# Patient Record
Sex: FEMALE | Race: WHITE | Employment: FULL TIME
[De-identification: names, ages, dates, MRNs, and addresses within clinical notes are randomized per-mention and may not be internally consistent; named-entity substitution may affect disease eponyms.]

---

## 2024-08-07 ENCOUNTER — HOSPITAL ENCOUNTER (OUTPATIENT)
Facility: HOSPITAL | Age: 39
Discharge: HOME | End: 2024-08-10
Payer: COMMERCIAL

## 2024-08-07 VITALS — WEIGHT: 266 LBS | BODY MASS INDEX: 40.32 KG/M2 | HEIGHT: 68 IN

## 2024-08-07 DIAGNOSIS — Z12.31 VISIT FOR SCREENING MAMMOGRAM: ICD-10-CM

## 2024-08-07 PROCEDURE — 77067 SCR MAMMO BI INCL CAD: CPT

## 2024-10-02 ENCOUNTER — HOSPITAL ENCOUNTER (OUTPATIENT)
Facility: HOSPITAL | Age: 39
Discharge: HOME | End: 2024-10-05
Payer: COMMERCIAL

## 2024-10-02 DIAGNOSIS — R92.8 ABNORMAL MAMMOGRAM: ICD-10-CM

## 2024-10-02 PROCEDURE — 76642 ULTRASOUND BREAST LIMITED: CPT

## 2024-10-02 PROCEDURE — G0279 TOMOSYNTHESIS, MAMMO: HCPCS

## 2024-11-15 ENCOUNTER — OFFICE VISIT (OUTPATIENT)
Age: 39
End: 2024-11-15

## 2024-11-15 VITALS
RESPIRATION RATE: 18 BRPM | TEMPERATURE: 97.8 F | OXYGEN SATURATION: 94 % | BODY MASS INDEX: 41.55 KG/M2 | SYSTOLIC BLOOD PRESSURE: 124 MMHG | DIASTOLIC BLOOD PRESSURE: 84 MMHG | HEART RATE: 86 BPM | WEIGHT: 271 LBS

## 2024-11-15 DIAGNOSIS — H00.016 HORDEOLUM EXTERNUM OF LEFT EYE, UNSPECIFIED EYELID: Primary | ICD-10-CM

## 2024-11-15 RX ORDER — LOSARTAN POTASSIUM 25 MG/1
25 TABLET ORAL DAILY
COMMUNITY

## 2024-11-15 RX ORDER — POLYMYXIN B SULFATE 500000 [IU]/1
10000 INJECTION, POWDER, LYOPHILIZED, FOR SOLUTION INTRAMUSCULAR; INTRATHECAL; INTRAVENOUS; OPHTHALMIC EVERY 24 HOURS
COMMUNITY

## 2024-11-15 RX ORDER — ATORVASTATIN CALCIUM 40 MG/1
40 TABLET, FILM COATED ORAL DAILY
COMMUNITY

## 2024-11-15 NOTE — PROGRESS NOTES
spreading. Left eye./)      No results found for any visits on 11/15/24.    No results found for this visit on 11/15/24.    Review of Systems    ROS reviewed and negative except as stated in the HPI   Physical Exam  Vitals reviewed.   Constitutional:       General: She is not in acute distress.     Appearance: Normal appearance. She is not ill-appearing or toxic-appearing.   HENT:      Head: Normocephalic and atraumatic.   Eyes:      Extraocular Movements: Extraocular movements intact.      Conjunctiva/sclera: Conjunctivae normal.      Pupils: Pupils are equal, round, and reactive to light.   Cardiovascular:      Rate and Rhythm: Normal rate and regular rhythm.   Pulmonary:      Effort: Pulmonary effort is normal. No respiratory distress.   Abdominal:      General: There is no distension.      Palpations: There is no mass.      Tenderness: There is no abdominal tenderness.   Skin:     General: Skin is warm.      Comments: There is swelling and erythema in both the upper and lower eyelids of the left eye   Neurological:      General: No focal deficit present.      Mental Status: She is alert and oriented to person, place, and time.        Vitals:    11/15/24 1157   BP: 124/84   Site: Left Upper Arm   Position: Sitting   Cuff Size: Large Adult   Pulse: 86   Resp: 18   Temp: 97.8 °F (36.6 °C)   TempSrc: Temporal   SpO2: 94%   Weight: 122.9 kg (271 lb)        No Known Allergies    Prior to Admission medications    Medication Sig Start Date End Date Taking? Authorizing Provider   polymyxin B 309480 units injection 10,000 Units/kg every 24 hours   Yes Benjie Frias MD   adalimumab (HUMIRA) 40 MG/0.8ML injection Inject 0.8 mLs into the skin once   Yes Benjie Frias MD   atorvastatin (LIPITOR) 40 MG tablet Take 1 tablet by mouth daily   Yes Benjie Frias MD   losartan (COZAAR) 25 MG tablet Take 1 tablet by mouth daily   Yes Benjie Frias MD        Past Medical History:   Diagnosis Date

## 2024-11-15 NOTE — PATIENT INSTRUCTIONS
Exam and history consistent with hordeolum externum (stye) in the upper and lower eyelids of the left eye.  -Finish the entirety of the previously prescribed antibiotic course regardless of symptoms to avoid recurrence and antibiotic resistance  -Apply warm compresses to affected eyelids for at least 15 minutes 3 times a day  -Please note that a stye normally lasts from 1 to 2 weeks  -Ibuprofen 600 mg every 6 hours and Tylenol 500 mg every 6 hours for pain control  -Drink plenty of fluids to maintain hydration    If symptoms persist or worsen, please contact PCP and/or return to Urgent Care.

## 2024-11-19 ENCOUNTER — HOSPITAL ENCOUNTER (EMERGENCY)
Facility: HOSPITAL | Age: 39
Discharge: HOME | End: 2024-11-19
Attending: EMERGENCY MEDICINE
Payer: COMMERCIAL

## 2024-11-19 VITALS
HEIGHT: 67 IN | RESPIRATION RATE: 22 BRPM | DIASTOLIC BLOOD PRESSURE: 68 MMHG | HEART RATE: 88 BPM | TEMPERATURE: 98.5 F | BODY MASS INDEX: 42.38 KG/M2 | WEIGHT: 270 LBS | SYSTOLIC BLOOD PRESSURE: 134 MMHG | OXYGEN SATURATION: 96 %

## 2024-11-19 DIAGNOSIS — H00.15 CHALAZION LEFT LOWER EYELID: ICD-10-CM

## 2024-11-19 DIAGNOSIS — L03.213 PRESEPTAL CELLULITIS OF LEFT UPPER EYELID: Primary | ICD-10-CM

## 2024-11-19 PROCEDURE — 99283 EMERGENCY DEPT VISIT LOW MDM: CPT

## 2024-11-19 RX ORDER — TOBRAMYCIN 3 MG/ML
1 SOLUTION/ DROPS OPHTHALMIC EVERY 4 HOURS
Qty: 5 ML | Refills: 0 | Status: SHIPPED | OUTPATIENT
Start: 2024-11-19 | End: 2024-11-29

## 2024-11-19 RX ORDER — CEPHALEXIN 500 MG/1
500 CAPSULE ORAL 4 TIMES DAILY
Qty: 28 CAPSULE | Refills: 0 | Status: SHIPPED | OUTPATIENT
Start: 2024-11-19 | End: 2024-11-26

## 2024-11-19 ASSESSMENT — PAIN - FUNCTIONAL ASSESSMENT: PAIN_FUNCTIONAL_ASSESSMENT: NONE - DENIES PAIN

## 2024-11-19 NOTE — DISCHARGE INSTRUCTIONS
Apply Tobramycin eye ointment and take Keflex antibiotic orally every 6 hours, and use warm compresses as well.  You can clean the eyelashes and eyelids with baby shampoo and warm washcloths.    It was a pleasure taking care of you at [facility] Emergency Department today.  We know that when you come to [facility], you are entrusting us with your health, comfort, and safety.  Our physicians and nurses honor that trust, and we appreciate the opportunity to care for you and your loved ones.  We also value your feedback, and we would like to hear from you.    If you receive a  >>> survey <<< about your Emergency Department experience today, please fill it out. We review every single response from our patients. Thank you!

## 2024-11-19 NOTE — ED NOTES
Discharge instructions were given to the patient by Suraj George RN  .  The patient left the Emergency Department alert and oriented and in no acute distress with 2 prescription(s). The patient was encouraged to call or return to the ED for worsening issues or problems and was encouraged to schedule a follow up appointment for continuing care.  The patient verbalized understanding of discharge instructions and prescriptions; all questions were answered. The patient has no further concerns at this time.

## 2024-11-19 NOTE — ED NOTES
Pt presents to ED complaining of left eye swelling and irritation that began this morning when she woke up. Pt endorses itching and drainage with vision partially obscured by upper eyelid swelling. Pt is alert and oriented x 4, RR even and unlabored, skin is warm and dry. Pt appears in NAD at this time. Assessment completed and pt updated on plan of care.  Call bell in reach.   Emergency Department Nursing Plan of Care  The Nursing Plan of Care is developed from the Nursing assessment and Emergency Department Attending provider initial evaluation.  The plan of care may be reviewed in the “ED Provider note”.  The Plan of Care was developed with the following considerations:  Patient / Family readiness to learn indicated by:Refer to Medical chart in [facility]  Persons(s) to be included in education: Refer to Medical chart in [facility]  Barriers to Learning/Limitations:Normal

## 2024-11-19 NOTE — ED TRIAGE NOTES
Pt presents to ED c/o L eye swelling, redness, itching, and occ drainage. Pt reports going to urgent care and being prescribed antibiotics eye gtts with no relief. Pt reports swelling became worse this AM and needing to take ibuprofen pt does report relief at this time.

## 2024-11-20 ASSESSMENT — ENCOUNTER SYMPTOMS
EYE PAIN: 0
EYE ITCHING: 0
EYE REDNESS: 0

## 2024-11-20 NOTE — ED PROVIDER NOTES
EMERGENCY DEPARTMENT HISTORY AND PHYSICAL EXAM    Date: 11/19/2024  Patient Name: Brooke Childs  Patient Age and Sex: 39 y.o. female  MRN:  [id number]  CSN:  [id number]    History of Present Illness     Chief Complaint   Patient presents with    Eye Problem       History Provided By: Patient    Ability to gather history was limited by:     HPI: Brooke Childs, 39 y.o. female   Complains of swelling and redness of her left upper and lower eyelids worsening over the last few days.  Started as a small external bump, minimally tender, on the lower eyelid, now with worsening redness and swelling of the upper eyelid.  No significant watering or crusting.  No vision changes.  Pain is mild.  She was prescribed topical antibiotic ophthalmic drops a few days ago which she has been using without improvement.      Tobacco Use      Smoking status: Unknown      Smokeless tobacco: Not on file     Past History   The patient's medical, surgical, and social history were reviewed by me today.    Current Medications:  No current facility-administered medications on file prior to encounter.     Current Outpatient Medications on File Prior to Encounter   Medication Sig Dispense Refill    polymyxin B [id number] units injection 10,000 Units/kg every 24 hours      adalimumab (HUMIRA) 40 MG/0.8ML injection Inject 0.8 mLs into the skin once      atorvastatin (LIPITOR) 40 MG tablet Take 1 tablet by mouth daily      losartan (COZAAR) 25 MG tablet Take 1 tablet by mouth daily         Past Medical History:   Diagnosis Date    Hyperlipidemia     Hypertension        Past Surgical History:   Procedure Laterality Date    BREAST BIOPSY Left     negative, 2021       Social History     Tobacco Use    Smoking status: Unknown       Allergies:  No Known Allergies  Review of Systems   A Review of Systems was reviewed by me today during this encounter.  Pertinent positive and negative elements are noted in the HPI and MDM sections.    Review of Systems

## 2025-01-27 ENCOUNTER — OFFICE VISIT (OUTPATIENT)
Age: 40
End: 2025-01-27

## 2025-01-27 VITALS
DIASTOLIC BLOOD PRESSURE: 84 MMHG | TEMPERATURE: 98 F | BODY MASS INDEX: 42.6 KG/M2 | WEIGHT: 272 LBS | OXYGEN SATURATION: 98 % | HEART RATE: 74 BPM | SYSTOLIC BLOOD PRESSURE: 134 MMHG

## 2025-01-27 DIAGNOSIS — M54.50 ACUTE BILATERAL LOW BACK PAIN WITHOUT SCIATICA: Primary | ICD-10-CM

## 2025-01-27 RX ORDER — METHOCARBAMOL 750 MG/1
750 TABLET, FILM COATED ORAL 4 TIMES DAILY
Qty: 40 TABLET | Refills: 0 | Status: SHIPPED | OUTPATIENT
Start: 2025-01-27 | End: 2025-02-06

## 2025-01-27 NOTE — PATIENT INSTRUCTIONS
Thank you for visiting [facility] today.    If you develop a fever, gross inability to walk/weakness, severe numbness in bilateral lower extremities, saddle paresthesias, and/or loss of bladder/bowel control, please go to the nearest emergency department for concern of cauda equina.    -Muscle Relaxer (Flexeril, Robaxin, Tizanidine) should only be taken if needed, as it may cause drowsiness or dizziness.  Avoid driving.  Avoid alcohol/drugs that can also make you sleepy.  -Alternate between ice and heat.  Heat may be applied for 30 minutes at a time every 4-5 hours.  Take warm epsom salt baths and gentle stretches.  -Tylenol 1000 mg every 8 hours as needed for pain control  -Increase water hydration.  -Avoid lifting heavy objects.  -Back exercises once pain has been controlled  -Physical therapy referral sent    Please follow up with your primary care provider within 2-3 days if  your signs and symptoms have not resolved or worsened.    Please go immediately to the Emergency Department if you develop loss of bowel or bladder function, peripheral numbness/weakness/tingling, shortness of breath, chest pain, and significant fevers above 100.4F.

## 2025-01-27 NOTE — PROGRESS NOTES
2025   Brooke Childs (: 1985) is a 39 y.o. female, Established patient, here for evaluation of the following chief complaint(s):  Back Pain (Low back pain x 2 days)     ASSESSMENT/PLAN:  Below is the assessment and plan developed based on review of pertinent history, physical exam, labs, studies, and medications.  Assessment & Plan  Acute bilateral low back pain without sciatica       Orders:    [facility] - Physical Therapy at [facility]    methocarbamol (ROBAXIN-750) 750 MG tablet; Take 1 tablet by mouth 4 times daily for 10 days  If you develop a fever, gross inability to walk/weakness, severe numbness in bilateral lower extremities, saddle paresthesias, and/or loss of bladder/bowel control, please go to the nearest emergency department for concern of cauda equina.    -Muscle Relaxer (Flexeril, Robaxin, Tizanidine) should only be taken if needed, as it may cause drowsiness or dizziness.  Avoid driving.  Avoid alcohol/drugs that can also make you sleepy.  -Alternate between ice and heat.  Heat may be applied for 30 minutes at a time every 4-5 hours.  Take warm epsom salt baths and gentle stretches.  -Tylenol 1000 mg every 8 hours as needed for pain control  -Increase water hydration.  -Avoid lifting heavy objects.  -Back exercises once pain has been controlled  -Physical therapy referral sent    Please follow up with your primary care provider within 2-3 days if  your signs and symptoms have not resolved or worsened.    Please go immediately to the Emergency Department if you develop loss of bowel or bladder function, peripheral numbness/weakness/tingling, shortness of breath, chest pain, and significant fevers above 100.4F        Handout given with care instructions  2. OTC for symptom management. Increase fluid intake, ensure adequate nutritional intake.  3. Follow up with PCP as needed.  4. Go to ED with development of any acute symptoms.     Follow up:  No follow-ups on file.  Follow up

## 2025-02-18 ENCOUNTER — HOSPITAL ENCOUNTER (OUTPATIENT)
Facility: HOSPITAL | Age: 40
Setting detail: RECURRING SERIES
Discharge: HOME | End: 2025-02-21
Payer: COMMERCIAL

## 2025-02-18 PROCEDURE — 97162 PT EVAL MOD COMPLEX 30 MIN: CPT

## 2025-02-18 PROCEDURE — 97110 THERAPEUTIC EXERCISES: CPT

## 2025-02-18 NOTE — THERAPY EVALUATION
seen *** times per week for *** treatments.    Patient/ Caregiver education and instruction: Diagnosis, prognosis, {Outpt PT patient caregiver ed.:12372}   [x]  Plan of care has been reviewed with LULU Yusuf, PT       2/18/2025       12:10 PM        ===================================================================  I certify that the above Therapy Services are being furnished while the patient is under my care. I agree with the treatment plan and certify that this therapy is necessary.    Physician's Signature:_________________________   DATE:_________   TIME:________                           Prasanth Peter MD    ** Signature, Date and Time must be completed for valid certification **  Please sign and fax to [phone].  Thank you

## 2025-02-26 ENCOUNTER — HOSPITAL ENCOUNTER (OUTPATIENT)
Facility: HOSPITAL | Age: 40
Setting detail: RECURRING SERIES
Discharge: HOME | End: 2025-03-01
Payer: COMMERCIAL

## 2025-02-26 PROCEDURE — 97110 THERAPEUTIC EXERCISES: CPT

## 2025-02-26 NOTE — PROGRESS NOTES
PHYSICAL THERAPY - DAILY TREATMENT NOTE (updated 3/23)      Date: 2025          Patient Name:  Brooke Childs :  1985   Medical   Diagnosis:  Acute bilateral low back pain without sciatica [M54.50] Treatment Diagnosis:  M54.59  OTHER LOWER BACK PAIN    Referral Source:  Prasanth Peter MD Insurance:   Payor: AETNA / Plan: AETNA / Product Type: *No Product type* /                     Patient  verified yes     Visit #   Current  / Total 2 24   Time   In / Out [phone]   Total Treatment Time 44   Total Timed Codes 44         SUBJECTIVE    Pain Level (0-10 scale): 0/10    Any medication changes, allergies to medications, adverse drug reactions, diagnosis change, or new procedure performed?: [x] No    [] Yes (see summary sheet for update)  Medications: Verified on Patient Summary List    Subjective functional status/changes:     Patient noted they have been doing well, noting they have not had any pain in their back in the last week or so.     OBJECTIVE      Therapeutic Procedures:  Tx Min Billable or 1:1 Min (if diff from Tx Min) Procedure, Rationale, Specifics   44 [phone] [id number] Therapeutic Exercise (timed):  increase ROM, strength, coordination, balance, and proprioception to improve patient's ability to progress to PLOF and address remaining functional goals. (see flow sheet as applicable)     Details if applicable:                         44 44    Total Total           [x]  Patient Education billed concurrently with other procedures   [x] Review HEP    [] Progressed/Changed HEP, detail:    [] Other detail:         Other Objective/Functional Measures  NA    Pain Level at end of session (0-10 scale): 0/10      Assessment   Patient tolerated treatment session well today, able to perform new exercises and progressions without increasing pain symptoms post treatment session. Patient was able to progress with strengthening and with core stability during therex today. Continue to progress as tolerated.  Patient

## 2025-03-03 ENCOUNTER — HOSPITAL ENCOUNTER (OUTPATIENT)
Facility: HOSPITAL | Age: 40
Setting detail: RECURRING SERIES
Discharge: HOME | End: 2025-03-06
Payer: COMMERCIAL

## 2025-03-03 PROCEDURE — 97110 THERAPEUTIC EXERCISES: CPT

## 2025-03-03 NOTE — PROGRESS NOTES
PHYSICAL THERAPY - DAILY TREATMENT NOTE (updated 3/23)      Date: 3/3/2025          Patient Name:  Brooke Childs :  1985   Medical   Diagnosis:  Acute bilateral low back pain without sciatica [M54.50] Treatment Diagnosis:  M54.59  OTHER LOWER BACK PAIN    Referral Source:  Prasanth Peter MD Insurance:   Payor: AETNA / Plan: AETNA / Product Type: *No Product type* /                     Patient  verified yes     Visit #   Current  / Total 3 24   Time   In / Out 401 455   Total Treatment Time 54   Total Timed Codes 54         SUBJECTIVE    Pain Level (0-10 scale): 0/10    Any medication changes, allergies to medications, adverse drug reactions, diagnosis change, or new procedure performed?: [x] No    [] Yes (see summary sheet for update)  Medications: Verified on Patient Summary List    Subjective functional status/changes:     Patient noted they did well after last visit but did note some slight pain after being in the car driving for awhile (3 hours there Saturday, 3 hours ). Patient noted they were able to do some stretches  night and noted improvement overall.     OBJECTIVE      Therapeutic Procedures:  Tx Min Billable or 1:1 Min (if diff from Tx Min) Procedure, Rationale, Specifics   54  [id number] Therapeutic Exercise (timed):  increase ROM, strength, coordination, balance, and proprioception to improve patient's ability to progress to PLOF and address remaining functional goals. (see flow sheet as applicable)     Details if applicable:                         54     Total Total           [x]  Patient Education billed concurrently with other procedures   [x] Review HEP    [] Progressed/Changed HEP, detail:    [] Other detail:         Other Objective/Functional Measures  NA    Pain Level at end of session (0-10 scale): 0/10      Assessment   Patient tolerated treatment session well today, able to perform new exercises and progressions without increasing pain symptoms post treatment session.

## 2025-03-05 ENCOUNTER — APPOINTMENT (OUTPATIENT)
Facility: HOSPITAL | Age: 40
End: 2025-03-05
Payer: COMMERCIAL

## 2025-03-10 ENCOUNTER — HOSPITAL ENCOUNTER (OUTPATIENT)
Facility: HOSPITAL | Age: 40
Setting detail: RECURRING SERIES
Discharge: HOME | End: 2025-03-13
Payer: COMMERCIAL

## 2025-03-10 PROCEDURE — 97110 THERAPEUTIC EXERCISES: CPT

## 2025-03-10 NOTE — PROGRESS NOTES
PHYSICAL THERAPY - DAILY TREATMENT NOTE (updated 3/23)      Date: 3/10/2025          Patient Name:  Brooke Childs :  1985   Medical   Diagnosis:  Acute bilateral low back pain without sciatica [M54.50] Treatment Diagnosis:  M54.59  OTHER LOWER BACK PAIN    Referral Source:  Prasanth Peter MD Insurance:   Payor: AETNA / Plan: AETNA / Product Type: *No Product type* /                     Patient  verified yes     Visit #   Current  / Total 4 24   Time   In / Out 606 651   Total Treatment Time 45   Total Timed Codes 45         SUBJECTIVE    Pain Level (0-10 scale): 0/10    Any medication changes, allergies to medications, adverse drug reactions, diagnosis change, or new procedure performed?: [x] No    [] Yes (see summary sheet for update)  Medications: Verified on Patient Summary List    Subjective functional status/changes:     Patient noted they have not had any pain since last visit, just noting some increased muscular fatigue and soreness.     OBJECTIVE      Therapeutic Procedures:  Tx Min Billable or 1:1 Min (if diff from Tx Min) Procedure, Rationale, Specifics   45  26600 Therapeutic Exercise (timed):  increase ROM, strength, coordination, balance, and proprioception to improve patient's ability to progress to PLOF and address remaining functional goals. (see flow sheet as applicable)     Details if applicable:                         45     Total Total           [x]  Patient Education billed concurrently with other procedures   [x] Review HEP    [] Progressed/Changed HEP, detail:    [] Other detail:         Other Objective/Functional Measures  NA    Pain Level at end of session (0-10 scale): 0/10      Assessment   Patient tolerated treatment session well today, able to perform new exercises and progressions without increasing pain symptoms post treatment session. Patient did well with progressions and new exercises today. Continue to progress as tolerated.  Patient will continue to benefit from

## 2025-03-12 ENCOUNTER — HOSPITAL ENCOUNTER (OUTPATIENT)
Facility: HOSPITAL | Age: 40
Setting detail: RECURRING SERIES
Discharge: HOME | End: 2025-03-15
Payer: COMMERCIAL

## 2025-03-12 PROCEDURE — 97110 THERAPEUTIC EXERCISES: CPT

## 2025-03-12 NOTE — PROGRESS NOTES
PHYSICAL THERAPY - DAILY TREATMENT NOTE (updated 3/23)      Date: 3/12/2025          Patient Name:  Brooke Childs :  1985   Medical   Diagnosis:  Acute bilateral low back pain without sciatica [M54.50] Treatment Diagnosis:  M54.59  OTHER LOWER BACK PAIN    Referral Source:  Prasanth Peter MD Insurance:   Payor: AETNA / Plan: AETNA / Product Type: *No Product type* /                     Patient  verified yes     Visit #   Current  / Total 5 24   Time   In / Out 605 645   Total Treatment Time 40   Total Timed Codes 40         SUBJECTIVE    Pain Level (0-10 scale): /10    Any medication changes, allergies to medications, adverse drug reactions, diagnosis change, or new procedure performed?: [x] No    [] Yes (see summary sheet for update)  Medications: Verified on Patient Summary List    Subjective functional status/changes:     Patient noted they have been doing alright, noted some knee pain but no pain in their back or anything.     OBJECTIVE      Therapeutic Procedures:  Tx Min Billable or 1:1 Min (if diff from Tx Min) Procedure, Rationale, Specifics   40  38252 Therapeutic Exercise (timed):  increase ROM, strength, coordination, balance, and proprioception to improve patient's ability to progress to PLOF and address remaining functional goals. (see flow sheet as applicable)     Details if applicable:                         40     Total Total           [x]  Patient Education billed concurrently with other procedures   [x] Review HEP    [] Progressed/Changed HEP, detail:    [] Other detail:         Other Objective/Functional Measures  NA    Pain Level at end of session (0-10 scale): 0/10      Assessment   Patient tolerated treatment session well today, able to perform new exercises and progressions without increasing pain symptoms post treatment session. Patient was able to continue to progress with strengthening during therex and demonstrated proper form throughout. Continue to progress as

## 2025-03-17 ENCOUNTER — HOSPITAL ENCOUNTER (OUTPATIENT)
Facility: HOSPITAL | Age: 40
Setting detail: RECURRING SERIES
Discharge: HOME | End: 2025-03-20
Payer: COMMERCIAL

## 2025-03-17 PROCEDURE — 97110 THERAPEUTIC EXERCISES: CPT

## 2025-03-18 NOTE — PROGRESS NOTES
[facility]  [address]  [place] [address]  Phone: [phone]   Fax: [phone]     DISCHARGE SUMMARY  Patient Name: Brooke Childs : 1985   Treatment/Medical Diagnosis: Acute bilateral low back pain without sciatica [M54.50]   Referral Source: Prasanth Peter MD     Date of Initial Visit: 2025 Attended Visits: 6 Missed Visits: 1     SUMMARY OF TREATMENT  Patient is a 40 year old being seen for LBP. Patient has been seen for 6 visits and has been treated using therapeutic exercises to make improvements with lumbar ROM, lower quarter muscular strength, subjective improvements and functional outcome measures.    CURRENT STATUS  Patient is a 40 year old being seen for LBP. Patient has been seen for 6 visits and has made improvements with lumbar ROM, lower quarter muscular strength, subjective improvements and functional outcome measures. Patient noted in the last couple weeks their pain has ranged from 0-2/10, but have not had any radicular symptoms. Patient has also noted improvement with activities such as walking their dogs (previously hurt with each step), standing and walking (no longer feeling pain with either of these). Patient also noted global improvements with their lower quarter muscular strength and lumbar ROM, noting decreased pain with all movements. Patient has achieved 8/9 goals set for treatment and will be discharged following today's treatment session to continue to manage their symptoms with their HEP moving forward.    Short Term Goals: To be accomplished in 8-10 treatments.  Pt will be consistent and demonstrate I with HEP-MET  Pt will complain of pain 3-4/10 with all activity-MET  Pt will demonstrate improved ROM to complete all ADL's and household chores more efficiently-MET  Pt will be able to maintain positions for 60min without increased symptoms-MET  Long Term Goals: To be 
have not had any radicular symptoms. Patient has also noted improvement with activities such as walking their dogs (previously hurt with each step), standing and walking (no longer feeling pain with either of these). Patient also noted global improvements with their lower quarter muscular strength and lumbar ROM, noting decreased pain with all movements. Patient has achieved 8/9 goals set for treatment and will be discharged following today's treatment session to continue to manage their symptoms with their HEP moving forward.    Progress toward goals / Updated goals:  []  See Progress Note/Recertification    Short Term Goals: To be accomplished in 8-10 treatments.  Pt will be consistent and demonstrate I with HEP-MET  Pt will complain of pain 3-4/10 with all activity-MET  Pt will demonstrate improved ROM to complete all ADL's and household chores more efficiently-MET  Pt will be able to maintain positions for 60min without increased symptoms-MET     Long Term Goals: To be accomplished in 20-24 treatments.  Pt will complain of pain 0-1/10 with all activity  Pt will increase strength to stabilize core and maintain better postures with all activity-MET  Pt will increase FOTO score by 7 points to meet MDC and demonstrate improved function with all activity-MET  Pt will return to all activity and work duties without increased pain-MET  Pt will use proper form and posture to complete all work tasks without increased symptoms 100% of the time-MET         PLAN  Yes  Continue plan of care  Re-Cert Due: NA  [x]  Upgrade activities as tolerated  []  Discharge due to:  []  Other:      Ankur Goyal, PTA       3/17/2025       6:33 PM              
I have personally performed a face to face diagnostic evaluation on this patient. I have reviewed the ACP note and agree with the history, exam and plan of care, except as noted.

## 2025-05-28 ENCOUNTER — OFFICE VISIT (OUTPATIENT)
Age: 40
End: 2025-05-28
Payer: COMMERCIAL

## 2025-05-28 VITALS
SYSTOLIC BLOOD PRESSURE: 126 MMHG | DIASTOLIC BLOOD PRESSURE: 91 MMHG | TEMPERATURE: 98 F | RESPIRATION RATE: 20 BRPM | HEART RATE: 84 BPM | WEIGHT: 271.5 LBS | BODY MASS INDEX: 41.15 KG/M2 | HEIGHT: 68 IN | OXYGEN SATURATION: 98 %

## 2025-05-28 DIAGNOSIS — H00.14 CHALAZION LEFT UPPER EYELID: ICD-10-CM

## 2025-05-28 DIAGNOSIS — Z13.220 SCREENING FOR CHOLESTEROL LEVEL: ICD-10-CM

## 2025-05-28 DIAGNOSIS — Z13.0 SCREENING FOR DEFICIENCY ANEMIA: ICD-10-CM

## 2025-05-28 DIAGNOSIS — F41.8 MIXED ANXIETY DEPRESSIVE DISORDER: ICD-10-CM

## 2025-05-28 DIAGNOSIS — I10 PRIMARY HYPERTENSION: ICD-10-CM

## 2025-05-28 DIAGNOSIS — E55.9 VITAMIN D DEFICIENCY: ICD-10-CM

## 2025-05-28 DIAGNOSIS — Z00.00 WELL ADULT EXAM: ICD-10-CM

## 2025-05-28 DIAGNOSIS — L40.50 PSORIATIC ARTHROPATHY (HCC): ICD-10-CM

## 2025-05-28 DIAGNOSIS — G47.33 SLEEP APNEA, OBSTRUCTIVE: ICD-10-CM

## 2025-05-28 DIAGNOSIS — E78.2 MIXED HYPERLIPIDEMIA: ICD-10-CM

## 2025-05-28 DIAGNOSIS — Z76.89 ENCOUNTER TO ESTABLISH CARE: Primary | ICD-10-CM

## 2025-05-28 DIAGNOSIS — Z13.1 SCREENING FOR DIABETES MELLITUS: ICD-10-CM

## 2025-05-28 PROCEDURE — 3080F DIAST BP >= 90 MM HG: CPT | Performed by: STUDENT IN AN ORGANIZED HEALTH CARE EDUCATION/TRAINING PROGRAM

## 2025-05-28 PROCEDURE — 3074F SYST BP LT 130 MM HG: CPT | Performed by: STUDENT IN AN ORGANIZED HEALTH CARE EDUCATION/TRAINING PROGRAM

## 2025-05-28 PROCEDURE — 99386 PREV VISIT NEW AGE 40-64: CPT | Performed by: STUDENT IN AN ORGANIZED HEALTH CARE EDUCATION/TRAINING PROGRAM

## 2025-05-28 PROCEDURE — 99204 OFFICE O/P NEW MOD 45 MIN: CPT | Performed by: STUDENT IN AN ORGANIZED HEALTH CARE EDUCATION/TRAINING PROGRAM

## 2025-05-28 RX ORDER — ATORVASTATIN CALCIUM 40 MG/1
40 TABLET, FILM COATED ORAL DAILY
Qty: 90 TABLET | Refills: 2 | Status: SHIPPED | OUTPATIENT
Start: 2025-05-28

## 2025-05-28 RX ORDER — LOSARTAN POTASSIUM 25 MG/1
25 TABLET ORAL DAILY
Qty: 90 TABLET | Refills: 2 | Status: SHIPPED | OUTPATIENT
Start: 2025-05-28

## 2025-05-28 SDOH — ECONOMIC STABILITY: FOOD INSECURITY: WITHIN THE PAST 12 MONTHS, YOU WORRIED THAT YOUR FOOD WOULD RUN OUT BEFORE YOU GOT MONEY TO BUY MORE.: NEVER TRUE

## 2025-05-28 SDOH — ECONOMIC STABILITY: FOOD INSECURITY: WITHIN THE PAST 12 MONTHS, THE FOOD YOU BOUGHT JUST DIDN'T LAST AND YOU DIDN'T HAVE MONEY TO GET MORE.: NEVER TRUE

## 2025-05-28 ASSESSMENT — PATIENT HEALTH QUESTIONNAIRE - PHQ9
SUM OF ALL RESPONSES TO PHQ QUESTIONS 1-9: 0
SUM OF ALL RESPONSES TO PHQ QUESTIONS 1-9: 0
1. LITTLE INTEREST OR PLEASURE IN DOING THINGS: NOT AT ALL
SUM OF ALL RESPONSES TO PHQ QUESTIONS 1-9: 0
SUM OF ALL RESPONSES TO PHQ QUESTIONS 1-9: 0
2. FEELING DOWN, DEPRESSED OR HOPELESS: NOT AT ALL

## 2025-05-28 NOTE — PROGRESS NOTES
Chief Complaint   Patient presents with    Establish Care       \"Have you been to the ER, urgent care clinic since your last visit?  Hospitalized since your last visit?\"    NO    “Have you seen or consulted any other health care providers outside of [facility] since your last visit?”    NO     “Have you had a pap smear?”    NO    No cervical cancer screening on file             Click Here for Release of Records Request     No results found for this visit on 25.   Vitals:    25 1419   BP: (!) 126/91   Pulse: 84   Resp: 20   Temp: 98 °F (36.7 °C)   TempSrc: Temporal   SpO2: 98%   Weight: 123.2 kg (271 lb 8 oz)   Height: 1.715 m (5' 7.5\")      Health Maintenance Due   Topic Date Due    Lipids  Never done    Varicella vaccine (1 of 2 - 13+ 2-dose series) Never done    HIV screen  Never done    Hepatitis C screen  Never done    Hepatitis B vaccine (1 of 3 - 19+ 3-dose series) Never done    DTaP/Tdap/Td vaccine (1 - Tdap) Never done    Cervical cancer screen  Never done    Diabetes screen  Never done    COVID-19 Vaccine (2024- season) 2024        The patient, Brooke Childs, identity was verified by name and .

## 2025-05-28 NOTE — PROGRESS NOTES
[facility]  [address] [place] [address] [phone]        Establish Care Visit      Assessment and Plan      Diagnosis Orders   1. Encounter to establish care        2. Primary hypertension  losartan (COZAAR) 25 MG tablet      3. Mixed hyperlipidemia  atorvastatin (LIPITOR) 40 MG tablet      4. Sleep apnea, obstructive        5. Chalazion left upper eyelid        6. Psoriatic arthropathy (HCC)        7. Mixed anxiety depressive disorder        8. Screening for cholesterol level  Comprehensive Metabolic Panel    Lipid Panel      9. Well adult exam  Comprehensive Metabolic Panel    TSH      10. Screening for diabetes mellitus  Hemoglobin A1C      11. Screening for deficiency anemia  CBC      12. Vitamin D deficiency  Vitamin D 25 Hydroxy        Patient welcomed to the practice, and chart updated with the collected information as appropriate.     Well adult:All preventative recommendations given in regards to maintaining healthy weight, importance of immunizations, tobacco/alcohol cessation, recommended screenings per USPSTF guidelines, and a health promoting lifestyle. Updated past medical, social and family history as well.      Assessment & Plan  1. Hypertension: /91 today.  - restart losartan.  - Monitor BP regularly, report significant changes.    2. Hyperlipidemia.  - Prescribe atorvastatin.  - Fasting labs ordered for lipid profile.    3. Psoriatic Arthritis: Manageable symptoms, occasional hand pain.  - Continue Humira, report worsening symptoms.    4. Sleep Apnea: Improved sleep quality with CPAP.  - Continue CPAP.    5.  Chalazion  - Using compresses and OTC eyedrops.  - Continue to follow with eye specialist for steroid injection vs I&D if no improvement.    6. Depression and Anxiety: Beneficial therapy.  - Continue therapy, contact clinic if symptoms worsen.    7. Weight Management: Concerned about weight, improving diet and

## 2025-05-28 NOTE — PATIENT INSTRUCTIONS
It was nice seeing you today!     For this visit, I have ordered lab work to be completed. If you have not already, I recommend signing up for [employer] for easier access to these results. When the labs are completed, the results are automatically released to you. You may see the results before I have a chance to review them. I will send you a message once I have interpreted them. If you do not have MyChart, you can expect to receive a letter in the mail with my interpretation included as well.

## 2025-07-08 LAB
25(OH)D3+25(OH)D2 SERPL-MCNC: 20 NG/ML (ref 30–100)
ALBUMIN SERPL-MCNC: 4.3 G/DL (ref 3.9–4.9)
ALP SERPL-CCNC: 81 IU/L (ref 44–121)
ALT SERPL-CCNC: 22 IU/L (ref 0–32)
AST SERPL-CCNC: 18 IU/L (ref 0–40)
BASOPHILS # BLD AUTO: 0 X10E3/UL (ref 0–0.2)
BASOPHILS NFR BLD AUTO: 1 %
BILIRUB SERPL-MCNC: 0.4 MG/DL (ref 0–1.2)
BUN SERPL-MCNC: 11 MG/DL (ref 6–24)
BUN/CREAT SERPL: 12 (ref 9–23)
CALCIUM SERPL-MCNC: 9.2 MG/DL (ref 8.7–10.2)
CHLORIDE SERPL-SCNC: 102 MMOL/L (ref 96–106)
CHOLEST SERPL-MCNC: 160 MG/DL (ref 100–199)
CO2 SERPL-SCNC: 21 MMOL/L (ref 20–29)
CREAT SERPL-MCNC: 0.95 MG/DL (ref 0.57–1)
EGFRCR SERPLBLD CKD-EPI 2021: 77 ML/MIN/1.73
EOSINOPHIL # BLD AUTO: 0.2 X10E3/UL (ref 0–0.4)
EOSINOPHIL NFR BLD AUTO: 2 %
ERYTHROCYTE [DISTWIDTH] IN BLOOD BY AUTOMATED COUNT: 14.3 % (ref 11.7–15.4)
GLOBULIN SER CALC-MCNC: 3.1 G/DL (ref 1.5–4.5)
GLUCOSE SERPL-MCNC: 90 MG/DL (ref 70–99)
HBA1C MFR BLD: 5.9 % (ref 4.8–5.6)
HCT VFR BLD AUTO: 39.8 % (ref 34–46.6)
HDLC SERPL-MCNC: 42 MG/DL
HGB BLD-MCNC: 13 G/DL (ref 11.1–15.9)
IMM GRANULOCYTES # BLD AUTO: 0 X10E3/UL (ref 0–0.1)
IMM GRANULOCYTES NFR BLD AUTO: 0 %
LDLC SERPL CALC-MCNC: 98 MG/DL (ref 0–99)
LYMPHOCYTES # BLD AUTO: 2.6 X10E3/UL (ref 0.7–3.1)
LYMPHOCYTES NFR BLD AUTO: 32 %
MCH RBC QN AUTO: 28.7 PG (ref 26.6–33)
MCHC RBC AUTO-ENTMCNC: 32.7 G/DL (ref 31.5–35.7)
MCV RBC AUTO: 88 FL (ref 79–97)
MONOCYTES # BLD AUTO: 0.5 X10E3/UL (ref 0.1–0.9)
MONOCYTES NFR BLD AUTO: 6 %
NEUTROPHILS # BLD AUTO: 5 X10E3/UL (ref 1.4–7)
NEUTROPHILS NFR BLD AUTO: 59 %
PLATELET # BLD AUTO: 314 X10E3/UL (ref 150–450)
POTASSIUM SERPL-SCNC: 4.3 MMOL/L (ref 3.5–5.2)
PROT SERPL-MCNC: 7.4 G/DL (ref 6–8.5)
RBC # BLD AUTO: 4.53 X10E6/UL (ref 3.77–5.28)
SODIUM SERPL-SCNC: 138 MMOL/L (ref 134–144)
TRIGL SERPL-MCNC: 111 MG/DL (ref 0–149)
TSH SERPL DL<=0.005 MIU/L-ACNC: 1.85 UIU/ML (ref 0.45–4.5)
VLDLC SERPL CALC-MCNC: 20 MG/DL (ref 5–40)
WBC # BLD AUTO: 8.3 X10E3/UL (ref 3.4–10.8)

## 2025-08-13 ENCOUNTER — OFFICE VISIT (OUTPATIENT)
Age: 40
End: 2025-08-13

## 2025-08-13 VITALS
OXYGEN SATURATION: 100 % | DIASTOLIC BLOOD PRESSURE: 85 MMHG | SYSTOLIC BLOOD PRESSURE: 121 MMHG | WEIGHT: 273.4 LBS | BODY MASS INDEX: 42.19 KG/M2 | HEART RATE: 103 BPM | TEMPERATURE: 98.5 F | RESPIRATION RATE: 18 BRPM

## 2025-08-13 DIAGNOSIS — J06.9 VIRAL URI WITH COUGH: Primary | ICD-10-CM

## 2025-08-13 DIAGNOSIS — R05.1 ACUTE COUGH: ICD-10-CM

## 2025-08-13 LAB
Lab: NORMAL
PERFORMING INSTRUMENT: NORMAL
QC PASS/FAIL: NORMAL
SARS-COV-2, POC: NORMAL

## 2025-08-13 RX ORDER — ATORVASTATIN CALCIUM 40 MG/1
1 TABLET, FILM COATED ORAL
COMMUNITY

## 2025-08-13 RX ORDER — LOSARTAN POTASSIUM 25 MG/1
1 TABLET ORAL
COMMUNITY

## 2025-08-13 RX ORDER — BENZONATATE 200 MG/1
200 CAPSULE ORAL 3 TIMES DAILY PRN
Qty: 30 CAPSULE | Refills: 0 | Status: SHIPPED | OUTPATIENT
Start: 2025-08-13 | End: 2025-08-23

## 2025-09-02 ENCOUNTER — OFFICE VISIT (OUTPATIENT)
Age: 40
End: 2025-09-02
Payer: COMMERCIAL

## 2025-09-02 ENCOUNTER — CLINICAL DOCUMENTATION (OUTPATIENT)
Age: 40
End: 2025-09-02

## 2025-09-02 VITALS
DIASTOLIC BLOOD PRESSURE: 88 MMHG | RESPIRATION RATE: 18 BRPM | TEMPERATURE: 98 F | BODY MASS INDEX: 41.49 KG/M2 | HEART RATE: 89 BPM | HEIGHT: 68 IN | OXYGEN SATURATION: 99 % | SYSTOLIC BLOOD PRESSURE: 137 MMHG | WEIGHT: 273.8 LBS

## 2025-09-02 DIAGNOSIS — H00.14 CHALAZION LEFT UPPER EYELID: ICD-10-CM

## 2025-09-02 DIAGNOSIS — E55.9 VITAMIN D DEFICIENCY: ICD-10-CM

## 2025-09-02 DIAGNOSIS — L40.50 PSORIATIC ARTHROPATHY (HCC): ICD-10-CM

## 2025-09-02 DIAGNOSIS — I10 PRIMARY HYPERTENSION: Primary | ICD-10-CM

## 2025-09-02 DIAGNOSIS — R73.03 PREDIABETES: ICD-10-CM

## 2025-09-02 PROCEDURE — 99214 OFFICE O/P EST MOD 30 MIN: CPT | Performed by: STUDENT IN AN ORGANIZED HEALTH CARE EDUCATION/TRAINING PROGRAM

## 2025-09-02 PROCEDURE — 3079F DIAST BP 80-89 MM HG: CPT | Performed by: STUDENT IN AN ORGANIZED HEALTH CARE EDUCATION/TRAINING PROGRAM

## 2025-09-02 PROCEDURE — 3075F SYST BP GE 130 - 139MM HG: CPT | Performed by: STUDENT IN AN ORGANIZED HEALTH CARE EDUCATION/TRAINING PROGRAM

## 2025-09-02 RX ORDER — ERGOCALCIFEROL 1.25 MG/1
50000 CAPSULE, LIQUID FILLED ORAL WEEKLY
Qty: 4 CAPSULE | Refills: 0 | Status: SHIPPED | OUTPATIENT
Start: 2025-09-02

## 2025-09-02 RX ORDER — LIDOCAINE 50 MG/G
1 PATCH TOPICAL EVERY 24 HOURS
Qty: 30 PATCH | Refills: 3 | Status: SHIPPED | OUTPATIENT
Start: 2025-09-02

## 2025-09-02 RX ORDER — TRIAMCINOLONE ACETONIDE 1 MG/G
OINTMENT TOPICAL 2 TIMES DAILY
Qty: 80 G | Refills: 2 | Status: SHIPPED | OUTPATIENT
Start: 2025-09-02

## 2025-09-02 ASSESSMENT — PATIENT HEALTH QUESTIONNAIRE - PHQ9
1. LITTLE INTEREST OR PLEASURE IN DOING THINGS: NOT AT ALL
SUM OF ALL RESPONSES TO PHQ QUESTIONS 1-9: 0
1. LITTLE INTEREST OR PLEASURE IN DOING THINGS: NOT AT ALL
SUM OF ALL RESPONSES TO PHQ QUESTIONS 1-9: 0
2. FEELING DOWN, DEPRESSED OR HOPELESS: NOT AT ALL
SUM OF ALL RESPONSES TO PHQ QUESTIONS 1-9: 0
2. FEELING DOWN, DEPRESSED OR HOPELESS: NOT AT ALL
SUM OF ALL RESPONSES TO PHQ QUESTIONS 1-9: 0

## 2025-09-06 ENCOUNTER — HOSPITAL ENCOUNTER (EMERGENCY)
Facility: HOSPITAL | Age: 40
Discharge: HOME | End: 2025-09-06

## 2025-09-06 VITALS
RESPIRATION RATE: 18 BRPM | TEMPERATURE: 98.1 F | HEART RATE: 90 BPM | WEIGHT: 267.5 LBS | OXYGEN SATURATION: 95 % | BODY MASS INDEX: 41.99 KG/M2 | DIASTOLIC BLOOD PRESSURE: 95 MMHG | HEIGHT: 67 IN | SYSTOLIC BLOOD PRESSURE: 135 MMHG

## 2025-09-06 DIAGNOSIS — L40.9 PSORIASIS: Primary | ICD-10-CM

## 2025-09-06 RX ORDER — PREDNISONE 10 MG/1
TABLET ORAL
Qty: 1 EACH | Refills: 0 | Status: SHIPPED | OUTPATIENT
Start: 2025-09-06

## 2025-09-06 ASSESSMENT — PAIN SCALES - GENERAL: PAINLEVEL_OUTOF10: 0

## 2025-09-06 ASSESSMENT — PAIN - FUNCTIONAL ASSESSMENT: PAIN_FUNCTIONAL_ASSESSMENT: 0-10
